# Patient Record
Sex: MALE | Race: WHITE | NOT HISPANIC OR LATINO | Employment: UNEMPLOYED | URBAN - METROPOLITAN AREA
[De-identification: names, ages, dates, MRNs, and addresses within clinical notes are randomized per-mention and may not be internally consistent; named-entity substitution may affect disease eponyms.]

---

## 2021-05-03 ENCOUNTER — TRANSCRIBE ORDERS (OUTPATIENT)
Dept: DIABETES SERVICES | Facility: HOSPITAL | Age: 31
End: 2021-05-03

## 2021-05-03 DIAGNOSIS — E66.01 MORBID OBESITY (HCC): ICD-10-CM

## 2021-05-03 DIAGNOSIS — R74.8 ELEVATED LIVER ENZYMES: Primary | ICD-10-CM

## 2021-07-02 ENCOUNTER — HOSPITAL ENCOUNTER (OUTPATIENT)
Dept: NUTRITION | Facility: HOSPITAL | Age: 31
Setting detail: RECURRING SERIES
Discharge: HOME | End: 2021-07-02

## 2021-07-02 VITALS — WEIGHT: 315 LBS | HEIGHT: 69 IN | BODY MASS INDEX: 46.65 KG/M2

## 2021-07-02 PROCEDURE — 97802 MEDICAL NUTRITION INDIV IN: CPT

## 2021-07-02 NOTE — CONSULTS
"Adult Outpatient Nutrition  Assessment/PES    Patient Name:  Guillaume Leonardo  YOB: 1990  MRN: [id number]    Assessment Date:  7/2/2021    Comments:      This medical referred consult was provided via ZOOM as patient was unable to attend an in-office appointment today due to the COVID-19 crisis. Consent for treatment was given verbally. RD spent a total of 60 minutes with patient today.      Patient is a 30 year old male seen today for an initial nutrition visit related to weight loss and elevated liver enzymes. Patient shared he is seeking support for weight loss and approaches to prevent diabetes. Patient reported he was recently diagnosed with pre-diabetes and has a family history of diabetes. He reported he recently decreased his soda intake from 8 regular pepsi cans per day down to 1 regular soda per day and diet beverages. Patient has noticed some weight loss since reducing his soda intake, but he is unsure of the amount. He stated he has never attempted to lose weight before and is not sure where to start. Patient is currently unemployed and lives at home who prepare a majority of his meals. He reported that he typically eats out every other day at various fast food restaurants. Patient shared he has started walking 1-mile every other day. Patient stated he knows \"what to avoid,\" but he would like information today on what to eat. Health literacy assessment completed prior to session and patient demonstrated moderate-adequate health literacy. Patient reported no barriers to learning.     24-Hour Dietary Recall:  2:00 pm: spicy chicken sandwich from [employer], 12 fl oz regular Dr. Pepper  3:00 pm: 12 fl oz diet coke  5:00 pm: 12 fl oz regular lemonade  7:00 pm: 2 chicken tenders from [employer], 1 roll, 12 fl oz water  Typical day? Yes    Per dietary recall and interview, patient's diet is low in fruits, vegetables, whole grains, low fat dairy products, and essential fatty acids. " Patient typically eats 1-2 meals per day. A significant portion of his calories are coming from sugar-sweetened beverages. RD discussed the importance of eating consistent, balanced meals to promote a health weight, a healthy blood sugar, and overall health. RD recommended patient avoid going longer than 3-5 hours without food. RD explained how going long periods of time without food can cause blood sugar irregularities, may cause one's metabolic rate to lower, and may increase the risk for over eating. Patient stated he has only been eating one meal for 10-15 years. He stated he never knew the importance of eating consistent, balanced meals and is open to implementing this into his lifestyle. RD used the plate method to discuss the components of a balanced meal. RD recommended patient aim to have ~700 calories per meal (Assessed using 25 kcals/lg and subtracting 500 calories per day to promote weight loss), and 75 grams of carbohydrates per meal. RD shared several example meal and snack ideas with the patient. We reviewed restaurant nutrition with the patient and encourages him to look up nutrition information from restaurants prior to ordering. Patient stated he can become overwhelmed very easily, so he would like to take a pause on information and soak it in. He stated he feels like a realistic place for him to start would be to implement breakfast a few days per week. He stated he becomes full fast and does not know how his body will react to eating more. We also discussed the benefits of resistance training on promoting a healthy weight and blood sugar. RD encouraged patient to continue with his current exercise routine and to incorporate strength training into it. Patient stated he has access to some equipment at home. He stated he would like to set a goal to use strength equipment in-between the video games he plays. Patient set his own goals and stated he feels confident he can accomplish them. RD received  "permission to mail the patient recipes and education materials. RD encouraged patient to contact RD with any needs prior to follow up visit.      Goals:  • Have breakfast 2 days per week  • Implement resistance bands or movement in-between video games      Total of 60 minutes spent with patient on nutrition counseling. Education based on Academy of Dietetics and Nutrition guidelines. Patient was provided with RD's contact information. Follow up visit is scheduled for 8/9/21 at 11:45 am. Thank you for this referral.    General Info     Row Name 07/02/21 1558       Today's Session    Person(s) attending today's session  Patient     Services Used Today?  No       General Information    How Well Do You Speak English?  very well    Preferred Language  English    Are you able to read and write English?  Yes    Lives With  parent(s)    Last grade of school completed  12th          Anthropometrics     Row Name 07/02/21 1603 07/02/21 1558       Anthropometrics    Height  175.3 cm (69\")  175.3 cm (69\")    Weight  --  (!) 160 kg (352 lb 12.8 oz)       Ideal Body Weight (IBW)    Ideal Body Weight (IBW) (kg)  73.69  73.69    % Ideal Body Weight  --  217.18       Body Mass Index (BMI)    BMI (kg/m2)  --  52.21        Nutritional Info/Activity     Row Name 07/02/21 155       Nutritional Information    Have you had weight changes?  Yes    Describe weight changes  Patient stated he has decreased his soda intake, so his weight has decreased, but he is unsure of the amount.    Have you tried to lose weight before?  No    Food Allergies  -- none    What cultural diet influences are important for you to follow?  none    Do you have difficulty chewing food?  No Patient has some teeth missing    Functional Status  able to prepare meals;able to purchase food    Food Behaviors  Boredom eater    How often do you eat out and where?  every other day; 1-2 times per day    Do you use Food Assistance programs (WIC, food stamps, food " "bank)?  no    Do you need information about Food Assistance programs?  no    How many diet sodas do you drink per day?  -- 2-3    What is the biggest challenge you have with your diet?  Other (comment) skipping meals    What type of support do you currently use to help you with your health issues?  family       Eating Environment    Eating environment  Alone;Family       Physical Activity    Are you currently involved in an activity/exercise program?   No    How many minutes do you spend on exercise each day?  0    How would you rank exercise as an important health lifestyle practice?  9          Estimated/Assessed Needs     Row Name 07/02/21 1603 07/02/21 1558       Calculation Measurements    Weight Used For Calculations  160 kg (352 lb)  --    Height  175.3 cm (69\")  175.3 cm (69\")       Estimated/Assessed Needs    Additional Documentation  KCAL/KG (Group)  --       KCAL/KG    KCAL/KG  14 Kcal/Kg (kcal)  --    14 Kcal/Kg (kcal)  2235.324  --          Labs/Tests/Procedures/Meds     Row Name 07/02/21 1603          Labs/Procedures/Meds    Lab Results Reviewed  reviewed     Lab Results Comments  4/20/21: ALT: 118 IU/L (range - 0-44 IU/L); AST: 53 IU/L (range: 0-40 IU/L)             Problem/Interventions:  Problem 1     Row Name 07/02/21 1605          Nutrition Diagnoses Problem 1    Problem 1  Overweight/Obesity     Etiology (related to)  -- lifestyle     Signs/Symptoms (evidenced by)  BMI     BMI  Greater than 40                 Intervention Goal     Row Name 07/02/21 1605          Intervention Goal    General  Meet nutritional needs for age/condition     Weight  Appropriate weight loss             Education/Evaluation     Row Name 07/02/21 1606          Education    Education  Advised regarding habits/behavior     Advised Regarding Habits/Behavior  Snacks;Appropriate beverage;Appropriate portions;Increased nutrient density;Eating out;Label reading;Eating pattern;Meal planning;Food choices        Monitor/Evaluation "    Monitor  PO intake;Weight     Education Follow-up  Reinforce PRN           Electronically signed by:  Kallie Galindo RD  07/02/21 16:06 EDT

## 2021-07-05 NOTE — ADDENDUM NOTE
Encounter addended by: Kallie Galindo RD on: 7/5/2021 8:55 AM   Actions taken: Pend clinical note, Clinical Note Signed

## 2021-08-09 ENCOUNTER — HOSPITAL ENCOUNTER (OUTPATIENT)
Dept: NUTRITION | Facility: HOSPITAL | Age: 31
Setting detail: RECURRING SERIES
Discharge: HOME | End: 2021-08-09

## 2021-08-09 NOTE — PROGRESS NOTES
Adult Outpatient Nutrition    Patient Name:  Guillaume Leonardo  YOB: 1990  MRN: [id number]    Assessment Date:  8/9/2021    Comments:      This medical referred consult was provided via ZOOM as patient was unable to attend an in-office appointment today due to the COVID-19 crisis. Consent for treatment was given verbally. RD spent a total of 30 minutes with patient today.      Patient is a 30 year old male seen today for a nutrition follow up visit. Patient reported he has been doing well since his initial nutrition visit. He stated he ate breakfast the day after our first visit, and then he forgot about it for 2 weeks. The 3rd week he remembered and ate breakfast twice. Most recently he stated his sleep schedule has changed, so he is now eating only once per day. Patient stated he felt better and more energized when he would eat more than one meal per day. He shared he is currently waking up at 4:00 am and going to sleep at 6:00 pm. He reported his one meal per day is typically around 8:00 am. He shared he will either eat at [employer] or eat left over dinner that his mother made. Patient reported he was using his resistance bands daily in-between his video games, up until recently. He shared his father cleaned the room and put the band away, so he is not sure where it is. He plans to ask his father where the band is. Most days patient spends a majority of his time watching TV, playing on his computer, or helping his dad around the house. Patient stated he would like to continue on eating two meals per day consistently.      24-Hour Dietary Recall:  8:00 am: 1 sausage biscuit, 1 hash brown, 16 fl oz orange juice from [employer]    RD encouraged patient with the progress he has made thus far. RD discussed the importance of eating consistent, balanced meals for overall health. Patient stated the biggest challenge he faces is not feeling hungry throughout the day. RD used the hunger-fullness scale (scale  ranging from 1-10) with the patient. He stated he feels his hunger is at a 1 (the most hungry) when he has the first meal of the day and a 10 (the most full) after he eats this meal. RD encouraged the patient that the goal would be to avoid the extremes on a consistent basis. Ideally, it would be the best to start eating around a 3-4 on the scale and stop eating around 7-8. Patient stated he has never thought of his hunger and fullness cues this closely before. RD encouraged patient to rate his hunger/fullness throughout the day. RD explained that he may be a 10 after his meal, but is he a 10 5 hours later? Would he be closer to a 7-8 by this time? RD explained the benefits of engaging in these cues. We discussed the benefits of mindfulness. Patient stated he is likely to forget to do this, so he would like to set a timer on his phone every 3 hours to ask rate his hunger and fullness. Patient stated he is looking forward to re-gaining awareness of his hunger and fullness. RD also encouraged patient to keep small, balanced snacks around, such as a cheese stick, yogurt cup, fruit, or nuts on hand. Even if the patient doesn't feel he can eat an entire meal, he may be able to have some nutrition. Patient stated he feels good about next steps. RD encouraged patient to contact her with any needs prior to next scheduled visit.      Goal Completion:  1) Have breakfast 2 days per week - 25%  2) Implement resistance bands or movement in-between video games - 75%    New Goals:  1) Have one meal and one balanced snack daily.  2) Rate hunger and fullness once daily.      Total of 30 minutes spent with patient on nutrition counseling. Education based on Academy of Dietetics and Nutrition guidelines. Patient was provided with RD's contact information. Follow up visit is scheduled for 9/16/21 at 2:15 pm. Thank you for this referral.         Electronically signed by:  Kallie Galindo RD  08/09/21 12:46 EDT

## 2021-09-16 ENCOUNTER — HOSPITAL ENCOUNTER (OUTPATIENT)
Dept: NUTRITION | Facility: HOSPITAL | Age: 31
Setting detail: RECURRING SERIES
Discharge: HOME | End: 2021-09-16

## 2021-09-16 PROCEDURE — 97803 MED NUTRITION INDIV SUBSEQ: CPT

## 2021-09-16 NOTE — PROGRESS NOTES
"Adult Outpatient Nutrition    Patient Name:  Guillaume Leonardo  YOB: 1990  MRN: [id number]    Assessment Date:  9/16/2021    Comments:      This medical referred consult was provided via ZOOM as patient was unable to attend an in-office appointment today due to the COVID-19 crisis. Consent for treatment was given verbally. RD spent a total of 30 minutes with patient today.      Patient is a 30 year old male seen today for a nutrition follow up visit. Patient reported he has been doing well since his initial nutrition visit.  He is unsure of any weight changes. Patient stated he continues to use resistance bands and weights in-between his video games and tv shows. He shared he has changed his sleep schedule and has been able to eat two meals per day instead of one meal per day. He feels good about his progress. He has questions today regarding air fryers.      24-Hour Dietary Recall:  1:00 pm: cheeseburger, medium vega, 16 fl ox Dr. Pepper  7:00 pm: Frozen personal cheese pizza, 12 fl oz diet coke    RD provided patient with encouragement with the progress he has made thus far. Per dietary recall and interview, patrient is not meeting nutrient needs. His diet is high in processed foods and low in fruits, vegetables, whole grains, low fat dairy products and essential fatty acids. We discussed possible benefits of an air fryer, including decreased fat intake, quicker cooking time, and bringing out different flavors and textures of food. RD provided patient with examples of different vegetable recipes that could be prepared in an air fryer. Patient stated he is thinking he would use his air fryer to make \"healthier and less greasy\" chicken nuggets compared to fast food. RD encouraged patient to think about next steps regarding food. Patient stated he is not sure what next step he could take, but he is open to ideas. RD suggested adding a new food into his diet, such as a vegetable, whole grain, nut, seed, " low fat dairy product, or fruit. RD provided patient with examples of how a first step could be adding a vegetable his typical fast food order. RD stated he could add something like a side salad or baby carrots with his burger and fries. Patient stated he does not care for any vegetables, but he is open to adding fruit into his diet at this time. We discussed different strategies to add fruit into his diet. Patient stated he would like to try frozen fruit, as it sounds good and will last longer. Patient set a goal to have a serving of fruit twice weekly. RD encouraged patient to contact RD with any needs prior to next scheduled visit.     Patient stated he feels good about next steps. RD encouraged patient to contact her with any needs prior to next scheduled visit.      Goal Completion:  1) Have one meal and one balanced snack daily - 0%  2) Rate hunger and fullness once daily - 100%    New Goal:  1) Have a serving of fruit twice per week.     Total of 30 minutes spent with patient on nutrition counseling. Education based on Academy of Dietetics and Nutrition guidelines. Patient was provided with RD's contact information. Follow up visit is scheduled for 10/21/21 at 2:15 pm. Thank you for this referral.       Electronically signed by:  Kallie Galindo RD  09/16/21 15:41 EDT

## 2021-10-21 ENCOUNTER — HOSPITAL ENCOUNTER (OUTPATIENT)
Dept: NUTRITION | Facility: HOSPITAL | Age: 31
Setting detail: RECURRING SERIES
Discharge: HOME | End: 2021-10-21

## 2021-10-21 PROCEDURE — 97803 MED NUTRITION INDIV SUBSEQ: CPT

## 2021-10-21 NOTE — PROGRESS NOTES
"Adult Outpatient Nutrition    Patient Name:  Guillaume Leonardo  YOB: 1990  MRN: [id number]    Assessment Date:  10/21/2021    Comments:      This medical referred consult was provided via ZOOM as patient was unable to attend an in-office appointment today due to the COVID-19 crisis. Consent for treatment was given verbally. RD spent a total of 30 minutes with patient today.      Patient is a 30 year old male seen today for a nutrition follow up visit. Patient shared he has stopped taking his depression medication due to side effects and has been in the process of working with his mental health provider to find him a new therapy. As a result, patient has been dealing with extreme depression and has \"not been in a good place mentally.\" He shared it has been difficult to do almost everything with the exception of eating. Since he stopped taking his medication, he reported his hunger que's have started to normalize. Instead of eating once daily, patient stated he has been eating 3-4 smaller meals per day. Patient is primarily eating out a majority of his meals. He reported he has stopped receiving disability and has no income at the moment. Because of this, patient stated he is dependent upon what his family purchases to eat. Patient stated he is not sure where to start today, but he is open to RD recommendations.     24-Hour Dietary Recall:  9:00 am: sausage biscuit from [employer]  12:00 pm: burger, small vega, 16 fl oz lemonade  4:00 pm: 2 fried chicken strips, 1/2 cup macaroni and cheese, 16 fl oz lemonade  6:30 pm: 2 fried chicken strips, 1/2 cup macaroni and cheese, 16 fl oz lemonade  Typical day? \"yes\"    RD encouraged patient and affirmed him. We discussed how he is in a difficult situation at the moment, and it is vital to be realistic. RD encouraged patient to focus on the things he can control. Patient stated he could consider ordering other foods when eating out. Patient suggested he could order " grilled chicken and a fruit cup once per week instead of fried chicken and fries. We also discussed strategies to increase his water intake. Patient stated it is difficult to drink water since it does not have caffeine. RD shared caffeine infused water with patient and he is interested in looking into this more. Patient feels this is a good place to start. Patient stated due to his financial situation he cannot continue RD visits at this time. RD encouraged patient to contact RD with any needs via phone or e-mail.      Goal Completion:  1) Have a serving of fruit twice per week - 0%     Total of 30 minutes spent with patient on nutrition counseling. Education based on Academy of Dietetics and Nutrition guidelines. Patient was provided with RD's contact information. Thank you for this referral.       Electronically signed by:  Kallie Galindo RD  10/21/21 15:24 EDT